# Patient Record
(demographics unavailable — no encounter records)

---

## 2025-07-07 NOTE — HISTORY OF PRESENT ILLNESS
[Parents] : parents [Fruit] : fruit [Vegetables] : vegetables [Meat] : meat [Grains] : grains [Normal] : Normal [Brushing teeth] : Brushing teeth [Yes] : Patient goes to dentist yearly [Vitamin] : Primary Fluoride Source: Vitamin [No] : Not at  exposure [Water heater temperature set at <120 degrees F] : Water heater temperature set at <120 degrees F [Car seat in back seat] : Car seat in back seat [Smoke Detectors] : Smoke detectors [Supervised play near cars and streets] : Supervised play near cars and streets [Carbon Monoxide Detectors] : Carbon monoxide detectors [Up to date] : Up to date [FreeTextEntry7] : 3 y.o c [FreeTextEntry1] : attends playgroup at [employer] Pt grew 3.25in and gained 19.5lbs since 7/3/24  pt likes to eat fruit/eggs, waffles, pancakes, oatmeal, chicken, fast food once weekly, juice once weekly- no soda, playing outside for exercise. fhtx of diabetes, no pedro of thyroid disease

## 2025-07-07 NOTE — PHYSICAL EXAM

## 2025-07-07 NOTE — DISCUSSION/SUMMARY
[] : The components of the vaccine(s) to be administered today are listed in the plan of care. The disease(s) for which the vaccine(s) are intended to prevent and the risks have been discussed with the caretaker.  The risks are also included in the appropriate vaccination information statements which have been provided to the patient's caregiver.  The caregiver has given consent to vaccinate. [FreeTextEntry1] : Continue balanced diet with all food groups. Brush teeth twice a day with toothbrush. Recommend visit to dentist,MVI with fluoride daily if not taking fluorinated water. As per car seat 's guidelines, use foward-facing car seat in back seat of car. Switch to booster seat when child reaches highest weight/height for seat. Child needs to ride in a belt-positioning booster seat until  4 feet 9 inches has been reached and are between 8 and 12 years of age. Put toddler to sleep in own bed. Help toddler to maintain consistent daily routines and sleep schedule. Pre-K discussed. Ensure home is safe. Use consistent, positive discipline. Read aloud to toddler. Limit screen time to no more than 2 hours per day. Return for well child check in 1 year. D/W caregiver elevated BMI- advise exercise 60min daily, 5 fruit/veg daily, reviewed portion sizes, increase water intake, limit sugar containing beverages and snacks, f/u in 6months for weight/ height/nutrition check; obtain labwork as below; referral to nutritionist. heart murmur- refer to cardiology.

## 2025-07-07 NOTE — DEVELOPMENTAL MILESTONES
[Normal Development] : Normal Development [None] : none [FreeTextEntry1] : no vision or hearing concerns [place] within normal for age.